# Patient Record
(demographics unavailable — no encounter records)

---

## 2025-06-09 NOTE — DATA REVIEWED
Telemetry Bed?: Yes   Admitting Physician: KELLY CEBALLOS [[id number]]   Is this a telephone or verbal order?: This is a telephone order from the admitting physician   [FreeTextEntry1] : EKG NSR

## 2025-06-09 NOTE — HEALTH RISK ASSESSMENT
[Fair] :  ~his/her~ mood as fair [No] : In the past 12 months have you used drugs other than those required for medical reasons? No [No falls in past year] : Patient reported no falls in the past year [0] : 2) Feeling down, depressed, or hopeless: Not at all (0) [PHQ-2 Negative - No further assessment needed] : PHQ-2 Negative - No further assessment needed [de-identified] : walking pad 30 min daily [de-identified] : trying to watch diet [OTS1Rrrdy] : 0 [Patient reported mammogram was normal] : Patient reported mammogram was normal [Language] : denies difficulty with language [Behavior] : denies difficulty with behavior [Learning/Retaining New Information] : denies difficulty learning/retaining new information [Handling Complex Tasks] : denies difficulty handling complex tasks [Reasoning] : denies difficulty with reasoning [Spatial Ability and Orientation] : denies difficulty with spatial ability and orientation [Fully functional (bathing, dressing, toileting, transferring, walking, feeding)] : Fully functional (bathing, dressing, toileting, transferring, walking, feeding) [Fully functional (using the telephone, shopping, preparing meals, housekeeping, doing laundry, using] : Fully functional and needs no help or supervision to perform IADLs (using the telephone, shopping, preparing meals, housekeeping, doing laundry, using transportation, managing medications and managing finances) [Reports changes in hearing] : Reports no changes in hearing [Reports changes in vision] : Reports no changes in vision [Reports normal functional visual acuity (ie: able to read med bottle)] : Reports normal functional visual acuity [Reports changes in dental health] : Reports no changes in dental health [MammogramDate] : 2024 [ColonoscopyComments] : 3-5 years ago. [PapSmearComments] : many years [Patient/Caregiver not ready to engage] : , patient/caregiver not ready to engage [Name: ___] : Health Care Proxy's Name: [unfilled]  [Designated Healthcare Proxy] : Designated healthcare proxy [Relationship: ___] : Relationship: [unfilled] [AdvancecareDate] : 6/25 [Never] : Never [NO] : No

## 2025-06-09 NOTE — HISTORY OF PRESENT ILLNESS
[FreeTextEntry1] : annual [de-identified] : 63 yo F with HTN, HLD, DM presents for new patient annual.  Pt reports elevated liver enzymes with multiple statins. Pt currently on zetia.  DM-- borderline, not on meds, just diet/exercise HTN-- losartan  completed shingrix x 2. Pt wants to defer prevnar 21

## 2025-06-09 NOTE — CURRENT MEDS
[Yes] : Reviewed medication list for presence of high-risk medications. [Opioids] : opioids [Blood Thinners] : blood thinners [Muscle Relaxants] : muscle relaxants [Sedatives] : sedatives